# Patient Record
Sex: MALE | Employment: OTHER | URBAN - METROPOLITAN AREA
[De-identification: names, ages, dates, MRNs, and addresses within clinical notes are randomized per-mention and may not be internally consistent; named-entity substitution may affect disease eponyms.]

---

## 2019-02-14 ENCOUNTER — VIRTUAL VISIT (OUTPATIENT)
Dept: FAMILY MEDICINE | Facility: OTHER | Age: 39
End: 2019-02-14

## 2019-02-14 NOTE — PROGRESS NOTES
"Date:   Clinician: Tosha Wheeler  Clinician NPI: [id number]  Patient: Salo Arriaga  Patient : 1980  Patient Address: [address] [place] [address] [place] [address]  Patient Phone: [phone]  Visit Protocol: Eye conditions  Patient Summary:  Salo is a 38 year old (: 1980 ) male who initiated a Visit for conjunctivitis.  When asked the question \"Please sign me up to receive news, health information and promotions from [employer].\", Salo responded \"No\".    Images of his eye condition were uploaded.   His symptoms started today and affect the left eye. The symptoms consist of drainage coming from the eye(s), eye redness, eyelid swelling, and itchy eye(s). A bright red spot similar to subconjunctival hemorrhage is also present on the eye(s).   Symptom details     Drainage: The color of the drainage coming out of his eye(s) is white. The drainage is thick and causes his eyelids to be stuck shut in the morning.    Itchiness: Salo does not have seasonal allergies or hay fever.     Denied symptoms include light sensitivity, eye pain, and bumps on the eyelid. Salo has not experienced a decrease in vision. He does not feel feverish.   Salo has recently been exposed to someone with a red eye or an eye infection. In the past 2 weeks, he has had a cold or an ear infection.   Salo has not had a recent diagnosis of conjunctivitis. He also has not had a recent foreign body in the eye(s), eye injury, and eye surgery. He does not wear contact lenses. Salo has not ever been diagnosed with glaucoma.   Salo is not taking medication to treat his current symptoms.   Salo does not require proof of evaluation of his eye condition before returning to school, work, or .   Salo does not smoke or use smokeless tobacco.   MEDICATIONS: No current medications, ALLERGIES: NKDA  Clinician Response:  Dear Salo,  Based on the information provided, you most likely have bacterial conjunctivitis, more commonly " called pink eye.  I am prescribing:  Polymyxin B sulf-trimethoprim (Polytrim) 10,000 unit- 1 mg/mL ophthalmic (eye) drops. Apply 1 drop into the affected eye(s) every 3 hours, up to 6 times a day for 7 days. There are no refills with this prescription.  The medication I prescribed is an antibiotic medication. Infections can be caused by either bacteria or a virus, and often have similar symptoms, so it is possible that this is a viral infection. Antibiotics are only effective against bacterial infections, so when it is caused by a virus, the medication will not help symptoms improve or make it less contagious.  To reduce the spread of the eye infection, be sure to wash your hands at least once per hour and avoid touching the eyes as much as possible.  The following will reduce the risk for future eye infections:     Frequent handwashing    Replace towels and washcloths daily    Do not share towels and washcloths with others     Follow up with your provider if you are taking your medication as directed and do not see any improvements after 2 days. Be seen earlier if you develop any new or worsening symptoms.   Diagnosis: Bacterial conjunctivitis  Diagnosis ICD: H10.9  Prescription: polymyxin B sulf-trimethoprim (Polytrim) 10,000 unit- 1 mg/mL ophthalmic (eye) drops 1 10 ml dropper bottle, 7 days supply. Apply 1 drop into the affected eye(s) every 3 hours up to 6 times a day for 7 days. Refills: 0, Refill as needed: no, Allow substitutions: yes  Pharmacy: [facility] - [phone] - [address] [place], [place] [phone]